# Patient Record
(demographics unavailable — no encounter records)

---

## 2025-07-22 NOTE — PHYSICAL EXAM
[Bowel Sounds] : normal bowel sounds [Abdomen Tenderness] : non-tender [No Masses] : no abdominal mass palpated [Abdomen Soft] : soft [Normal Sphincter Tone] : normal sphincter tone [No External Hemorrhoid] : no external hemorrhoids [No Rectal Mass] : no rectal mass [Supraclavicular Lymph Nodes Enlarged Bilaterally] : no supraclavicular lymphadenopathy [Cervical Lymph Nodes Enlarged Posterior Bilaterally] : no posterior cervical lymphadenopathy [No Clubbing, Cyanosis] : no clubbing or cyanosis of the fingernails [Normal Color / Pigmentation] : normal skin color and pigmentation [] : no rash [Normal] : oriented to person, place, and time [Chaperone Declined] : chaperone declined [de-identified] : no fissure

## 2025-07-22 NOTE — ASSESSMENT
[FreeTextEntry1] : IMPRESSION: # Screening colonoscopy - Colonoscopy by Dr. Noriega on June 2023: Diminutive sigmoid colon polyp removed (negative pathology).  Right and left colon biopsy showed negative microscopic colitis.  Repeat 5 years  #  Family history of colon cancer-seen genetic counselor on April 2025 because of family history of New syndrome and she requested time to decide about testing. - Brother had colon cancer and was diagnosed with New syndrome at age 54-he had a pathogenic mutation of MLH1 gene  # NERD vs functional symptoms - Upper endoscopy by Dr. Noriega on June 2023: Normal esophagus status post biopsy (negative for Evangelista's esophagus).  Small hiatal hernia.  Normal gastric mucosa status post biopsy (positive for H. pylori bacteria without intestinal metaplasia).  Normal duodenum status post biopsy (negative for celiac disease).  Status posttreatment for H. pylori with negative breath test on August 2023    PLAN: I discussed a colonoscopy to rule out colon polyps, colorectal cancer etc. under monitored anesthesia care.  Risks such as perforation requiring surgery, colostomy, bleeding requiring blood transfusion, infection/sepsis, diverticulitis, colitis, missed colon cancer (2% to 6%), internal organ injury such as splenic hemorrhage (1/6000, risk thin, female gender) etc, adverse reaction to medication etc. and risks of anesthesia including cardiopulmonary compromise requiring ICU care were discussed with patient.  Alternatives were discussed (CT colonography, stool test).  Patient verbalized understanding and agrees to proceed with a colonoscopy under anesthesia.  Importance of genetic testing reviewed -she will contact genetic counselor.

## 2025-07-22 NOTE — HISTORY OF PRESENT ILLNESS
[FreeTextEntry1] : 49 y/o mother of 4, who presents with complaints of having an anal fissure.  The patient reports that she has had bright red blood per rectum noticed when wiping since the past year.  She says she has anal pruritus.  She has been given a cream by her gynecologist and says she continues to bleed from her rectum.  She says she is worried because her brother ultimately developed colorectal cancer.  She has been using magnesium citrate for more than a year to have more complete bowel movements. Patient denies having abdominal pain, loss of appetite, weight loss, and melena. Grandfather had esophageal cancer in his 50s.  No other digestive cancers in the family.  All other review of systems are negative.   Initial office visit 5/2023:  SHe has been reflux since years - avoid food triggers. Last year worse reflux - felt sometime was stuck in upper chest - it has gotten better a little bit - she does not take acid reducers.  Says has had "unusual diarrhea" - since last year loose stools every day - prior to that she says she was the "opposite" - she says she would have irregular stools prior to Otezla- says this may due to otezla since 2 years.  She has been having rectal bleeding since last year - see it mainly when she wipes.    She has lost 15 lbs but gained back 5 lbs - she attributes the weight to otezla - says she watches her food intake - exercised 5 to 6 days a week.    Brother had bladder cancer at 39 - in remission. Brother had colon polyps at young age - he has required a colonoscopy since his 30s - he was an active smoker - she says he had multiple colon polyps - does not recall if ever advanced.    Father had cancer of unknown origin at age 40 - squamous cell tumor.    Maternal grandfather had liver cancer - unclear etiology - recall young age cancer.    Says she maybe perimenausal - gets palpitations.